# Patient Record
Sex: MALE | Race: WHITE | NOT HISPANIC OR LATINO
[De-identification: names, ages, dates, MRNs, and addresses within clinical notes are randomized per-mention and may not be internally consistent; named-entity substitution may affect disease eponyms.]

---

## 2018-04-08 PROBLEM — Z00.00 ENCOUNTER FOR PREVENTIVE HEALTH EXAMINATION: Status: ACTIVE | Noted: 2018-04-08

## 2018-06-06 ENCOUNTER — APPOINTMENT (OUTPATIENT)
Dept: CARDIOLOGY | Facility: CLINIC | Age: 33
End: 2018-06-06

## 2018-11-14 ENCOUNTER — EMERGENCY (EMERGENCY)
Facility: HOSPITAL | Age: 33
LOS: 0 days | Discharge: HOME | End: 2018-11-14
Admitting: PHYSICIAN ASSISTANT

## 2018-11-14 VITALS
SYSTOLIC BLOOD PRESSURE: 141 MMHG | TEMPERATURE: 98 F | DIASTOLIC BLOOD PRESSURE: 74 MMHG | HEART RATE: 76 BPM | RESPIRATION RATE: 19 BRPM | OXYGEN SATURATION: 98 %

## 2018-11-14 VITALS — WEIGHT: 199.96 LBS | HEIGHT: 70 IN

## 2018-11-14 DIAGNOSIS — N39.0 URINARY TRACT INFECTION, SITE NOT SPECIFIED: ICD-10-CM

## 2018-11-14 DIAGNOSIS — R30.0 DYSURIA: ICD-10-CM

## 2018-11-14 LAB
APPEARANCE UR: CLEAR — SIGNIFICANT CHANGE UP
BILIRUB UR-MCNC: NEGATIVE — SIGNIFICANT CHANGE UP
COLOR SPEC: YELLOW — SIGNIFICANT CHANGE UP
DIFF PNL FLD: NEGATIVE — SIGNIFICANT CHANGE UP
GLUCOSE UR QL: NEGATIVE MG/DL — SIGNIFICANT CHANGE UP
KETONES UR-MCNC: NEGATIVE — SIGNIFICANT CHANGE UP
LEUKOCYTE ESTERASE UR-ACNC: NEGATIVE — SIGNIFICANT CHANGE UP
NITRITE UR-MCNC: NEGATIVE — SIGNIFICANT CHANGE UP
PH UR: 6 — SIGNIFICANT CHANGE UP (ref 5–8)
PROT UR-MCNC: NEGATIVE MG/DL — SIGNIFICANT CHANGE UP
SP GR SPEC: 1.02 — SIGNIFICANT CHANGE UP (ref 1.01–1.03)
UROBILINOGEN FLD QL: 0.2 MG/DL — SIGNIFICANT CHANGE UP (ref 0.2–0.2)

## 2018-11-14 RX ORDER — CEFUROXIME AXETIL 250 MG
1 TABLET ORAL
Qty: 14 | Refills: 0 | OUTPATIENT
Start: 2018-11-14 | End: 2018-11-20

## 2018-11-14 NOTE — ED PROVIDER NOTE - NSFOLLOWUPINSTRUCTIONS_ED_ALL_ED_FT
Take antibiotics as directed and finish the whole course. Follow up with urologist, if symptoms worsen return to the ER

## 2018-11-14 NOTE — ED PROVIDER NOTE - OBJECTIVE STATEMENT
Pt is a 32 yo M c/o urinary burning with hesitancy. Sts he does not feel he is fully voiding when he urinates. Reports burning at the end of urination. Also feels pressure in lower abdomen when he has to urinate. Denies any hematuria, discharge from the penis, swelling or pain to the testicles. No flank pain, fever or chills. Pt is a 34 yo M c/o urinary burning with hesitancy. Sts he does not feel he is fully voiding when he urinates. Reports burning at the end of urination. Also feels pressure in lower abdomen when he has to urinate. Denies any hematuria, discharge from the penis, swelling or pain to the testicles. No flank pain, fever or chills. Pt sts he is sexually active with his wife only, no hx of STD's.

## 2018-11-14 NOTE — ED PROVIDER NOTE - CARE PROVIDER_API CALL
Alfred Lucas), Urology  [address] [place] [address]  [address]  [place] [address]  Phone: [phone]  Fax: [phone]

## 2018-11-14 NOTE — ED ADULT TRIAGE NOTE - CHIEF COMPLAINT QUOTE
"the last few days i;ve noticed that i've had to pee a lot and it feels like i'm not completely emptying my bladder when I go. it's also burning when I urinate" -pt

## 2018-11-14 NOTE — ED PROVIDER NOTE - PHYSICAL EXAMINATION
CONST: Well appearing in NAD  EYES: PERRL, EOMI, Sclera and conjunctiva clear.   CARD: Normal S1 S2; Normal rate and rhythm  RESP: Equal BS B/L, No wheezes, rhonchi or rales. No distress  GI: Soft, non-tender, non-distended.  MS: Normal ROM in all extremities. No midline spinal tenderness. No CVA tenderness BL

## 2018-11-14 NOTE — ED PROVIDER NOTE - NS ED ROS FT
CONST: No fever, chills or bodyaches  EYES: No pain, redness, drainage or visual changes.  ENT: No ear pain or discharge, nasal discharge or congestion. No sore throat  CARD: No chest pain, palpitations  RESP: No SOB, cough, hemoptysis. No hx of asthma or COPD  GI: No abdominal pain, N/V/D  : +Dysuria  MS: No joint pain, back pain or extremity pain/injury  SKIN: No rashes  NEURO: No headache, dizziness, paresthesias or LOC

## 2018-11-15 LAB
C TRACH RRNA SPEC QL NAA+PROBE: SIGNIFICANT CHANGE UP
N GONORRHOEA RRNA SPEC QL NAA+PROBE: SIGNIFICANT CHANGE UP
SPECIMEN SOURCE: SIGNIFICANT CHANGE UP

## 2018-11-16 LAB
CULTURE RESULTS: SIGNIFICANT CHANGE UP
SPECIMEN SOURCE: SIGNIFICANT CHANGE UP

## 2018-11-21 ENCOUNTER — APPOINTMENT (OUTPATIENT)
Dept: UROLOGY | Facility: CLINIC | Age: 33
End: 2018-11-21
Payer: COMMERCIAL

## 2018-11-21 VITALS
BODY MASS INDEX: 28.63 KG/M2 | HEART RATE: 72 BPM | HEIGHT: 70 IN | WEIGHT: 200 LBS | DIASTOLIC BLOOD PRESSURE: 63 MMHG | SYSTOLIC BLOOD PRESSURE: 124 MMHG

## 2018-11-21 DIAGNOSIS — Z84.1 FAMILY HISTORY OF DISORDERS OF KIDNEY AND URETER: ICD-10-CM

## 2018-11-21 DIAGNOSIS — M12.20 VILLONODULAR SYNOVITIS (PIGMENTED), UNSPECIFIED SITE: ICD-10-CM

## 2018-11-21 PROCEDURE — 99203 OFFICE O/P NEW LOW 30 MIN: CPT

## 2018-11-21 NOTE — PHYSICAL EXAM
[General Appearance - Well Developed] : well developed [General Appearance - Well Nourished] : well nourished [Normal Appearance] : normal appearance [Well Groomed] : well groomed [General Appearance - In No Acute Distress] : no acute distress [Heart Rate And Rhythm] : Heart rate and rhythm were normal [Edema] : no peripheral edema [] : no respiratory distress [Respiration, Rhythm And Depth] : normal respiratory rhythm and effort [Exaggerated Use Of Accessory Muscles For Inspiration] : no accessory muscle use [Auscultation Breath Sounds / Voice Sounds] : lungs were clear to auscultation bilaterally [Abdomen Soft] : soft [Abdomen Tenderness] : non-tender [Costovertebral Angle Tenderness] : no ~M costovertebral angle tenderness [Urethral Meatus] : meatus normal [Penis Abnormality] : normal circumcised penis [Testes Tenderness] : no tenderness of the testes [Testes Mass (___cm)] : there were no testicular masses [Anus Abnormality] : the anus and perineum were normal [Prostate Tenderness] : the prostate was not tender [No Prostate Nodules] : no prostate nodules [Prostate Size ___ gm] : prostate size [unfilled] gm [Normal Station and Gait] : the gait and station were normal for the patient's age [No Focal Deficits] : no focal deficits [FreeTextEntry1] : DTR's & BC reflexes were intact  [Oriented To Time, Place, And Person] : oriented to person, place, and time [Affect] : the affect was normal [Mood] : the mood was normal [Not Anxious] : not anxious

## 2018-11-21 NOTE — HISTORY OF PRESENT ILLNESS
[FreeTextEntry1] : Jose is a very pleasant 33-year-old male who the weekend before last started noticing some discomfort when he went to urinate. By Wednesday, November 14 he started feeling of pressure that he had to go, when he went he never felt like he had completely emptied, it was associated with a discomfort deep inside occasionally would get quite sharp almost 4 on a scale of 1 to 10 it will only last a few seconds. He got that bad that he went to the emergency room where they diagnosed him as a urinary tract infection and put him on cefuroxime. He is here for follow-up. He just got  in August, he has not yet gotten her pregnant but then I try and get, and as a whole he feels he’s in good health. His stream is not as strong as it was years ago but he still feels is normal for his age. He wakes up once at night to urinate but says it is with a large volume. 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 \par  [Urinary Frequency] : urinary frequency [Nocturia] : nocturia [Dysuria] : dysuria [Abdominal Pain] : no abdominal pain [Flank Pain] : no flank pain [4] : 4 [Sharp] : sharp [Sudden] : sudden [___ Day(s) Ago] : [unfilled] day(s) ago [Intermittent] : intermittently [___ x Day] : occur [unfilled] times a day [Moderate] : moderate in severity [Improving] : improving [None] : No exacerbating factors are noted [de-identified] : inner scrotum [de-identified] : seconds

## 2018-11-21 NOTE — ASSESSMENT
[FreeTextEntry1] : His physical exam is benign other than for what feels like a cyst in the left epididymis. He tells me that in fact a year ago he had an ultrasound done which confirmed the benign nature. In specific there is no sign of urethritis, orchitis, epididymitis, CVA tenderness, or abdominal pain.\par \par By definition he had no infection is sorely studies were negative, he hasn’t had a discharge and prostatic massage today did not yield Expressed Prostatic Secretions (EPS) and was not sensitive to the point that I would consider this prostatodynia. But I am concerned about is the family history of stones and the symptoms could exactly mimic a distal ureteric stone. I’m going to have a strain his urine as he still having the symptoms in the last 24-hours hopefully if he did have one it is past he will catch up, I will send him for a CT scan stone protocol and have him come back on Monday. If he developed severe pain he will give us a call he’ll probably have to go emergency room but hopefully if it is a small stone and is passing will be able to document and it will pass on its own. If he does have stones then we will do a metabolic workup and probably involve a nephrologist as this is likely to have some genetic component given that his mother already has stones.\par

## 2018-11-29 ENCOUNTER — OTHER (OUTPATIENT)
Age: 33
End: 2018-11-29

## 2018-12-06 ENCOUNTER — FORM ENCOUNTER (OUTPATIENT)
Age: 33
End: 2018-12-06

## 2018-12-07 ENCOUNTER — OUTPATIENT (OUTPATIENT)
Dept: OUTPATIENT SERVICES | Facility: HOSPITAL | Age: 33
LOS: 1 days | Discharge: HOME | End: 2018-12-07

## 2018-12-07 DIAGNOSIS — R30.0 DYSURIA: ICD-10-CM

## 2019-02-07 ENCOUNTER — APPOINTMENT (OUTPATIENT)
Dept: UROLOGY | Facility: CLINIC | Age: 34
End: 2019-02-07
Payer: COMMERCIAL

## 2019-02-07 VITALS
DIASTOLIC BLOOD PRESSURE: 84 MMHG | SYSTOLIC BLOOD PRESSURE: 152 MMHG | BODY MASS INDEX: 28.63 KG/M2 | WEIGHT: 200 LBS | HEIGHT: 70 IN | HEART RATE: 81 BPM

## 2019-02-07 DIAGNOSIS — Z87.898 PERSONAL HISTORY OF OTHER SPECIFIED CONDITIONS: ICD-10-CM

## 2019-02-07 PROCEDURE — 99213 OFFICE O/P EST LOW 20 MIN: CPT

## 2019-02-07 NOTE — ASSESSMENT
[FreeTextEntry1] : I do not know what is going on, there is no infection, there is no stones in this could be stress. I reviewed with him how stress can interfere with the flow of urine through the prostate with the increased intra-prostatic pressure sometimes causing irritative feelings. There are two main drugs that we use to relax the prostate – Flomax which has fewer systemic symptoms especially with related to blood pressure but can cause a lack of emission/ejaculation and – Uroxatral which is not affect ejaculation but can cause orthostatic hypotension. As long as he’s willing to be careful when he gets out of bed I think especially in a young man the side effect of lack of ejaculation is worse than the potential side effect of orthostatic hypotension. Obviously if he gets up begins to feel dizzy he will stop the Uroxatral and get back to me. He will take this for several weeks, keeping a record of his intake and output before he starts and then six weeks after he starts in follow up afterwards possibly for a flow study and a measurement of his post would residual.

## 2019-02-07 NOTE — PHYSICAL EXAM
[General Appearance - Well Developed] : well developed [General Appearance - Well Nourished] : well nourished [Normal Appearance] : normal appearance [Well Groomed] : well groomed [General Appearance - In No Acute Distress] : no acute distress [FreeTextEntry1] : nereyda uptight [Abdomen Soft] : soft [Abdomen Tenderness] : non-tender [Costovertebral Angle Tenderness] : no ~M costovertebral angle tenderness [] : no respiratory distress [Respiration, Rhythm And Depth] : normal respiratory rhythm and effort [Exaggerated Use Of Accessory Muscles For Inspiration] : no accessory muscle use [Oriented To Time, Place, And Person] : oriented to person, place, and time [Affect] : the affect was normal [Mood] : the mood was normal [Not Anxious] : not anxious [Normal Station and Gait] : the gait and station were normal for the patient's age

## 2019-02-07 NOTE — LETTER HEADER
[FreeTextEntry3] : Alfred Lucas M.D.\par Director of Urology\par [facility]\par [address] [place] [address], [address]\[place] [address]

## 2019-02-07 NOTE — HISTORY OF PRESENT ILLNESS
[FreeTextEntry1] : Jose was seen in November of two 2018 complaining of dysuria with lower urinary tract symptoms. The exam had shown nothing. He had been given cefuroxime though please note that the culture came back as no growth. At first he felt a little better but at the three days the symptoms came back. Given that I found nothing on exam but there was a history of stones I thought this could be a distal ureteric stone. We had him strain his urine and we sent him for a CAT scan stone protocol and he was supposed to come back the next week. 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 He is here to review the CAT scan and see what else we can do. [Urinary Frequency] : urinary frequency [Nocturia] : nocturia [Weak Stream] : weak stream [Abdominal Pain] : no abdominal pain [Flank Pain] : no flank pain [4] : 4 [Sharp] : sharp [Sudden] : sudden [___ Day(s) Ago] : [unfilled] day(s) ago [Intermittent] : intermittently [___ x Day] : occur [unfilled] times a day [Moderate] : moderate in severity [Improving] : improving [None] : No exacerbating factors are noted [de-identified] : inner scrotum [de-identified] : seconds

## 2019-04-18 ENCOUNTER — APPOINTMENT (OUTPATIENT)
Dept: UROLOGY | Facility: CLINIC | Age: 34
End: 2019-04-18
Payer: COMMERCIAL

## 2019-04-18 DIAGNOSIS — R35.1 NOCTURIA: ICD-10-CM

## 2019-04-18 DIAGNOSIS — L81.9 DISORDER OF PIGMENTATION, UNSPECIFIED: ICD-10-CM

## 2019-04-18 DIAGNOSIS — R35.0 FREQUENCY OF MICTURITION: ICD-10-CM

## 2019-04-18 DIAGNOSIS — Z78.9 OTHER SPECIFIED HEALTH STATUS: ICD-10-CM

## 2019-04-18 LAB
BILIRUB UR QL STRIP: NORMAL
CLARITY UR: CLEAR
COLLECTION METHOD: NORMAL
GLUCOSE UR-MCNC: NORMAL
HCG UR QL: NORMAL EU/DL
HGB UR QL STRIP.AUTO: NORMAL
KETONES UR-MCNC: NORMAL
LEUKOCYTE ESTERASE UR QL STRIP: NORMAL
NITRITE UR QL STRIP: NORMAL
PH UR STRIP: 5
PROT UR STRIP-MCNC: NORMAL
SP GR UR STRIP: 1.01

## 2019-04-18 PROCEDURE — 81003 URINALYSIS AUTO W/O SCOPE: CPT | Mod: QW

## 2019-04-18 PROCEDURE — 51741 ELECTRO-UROFLOWMETRY FIRST: CPT

## 2019-04-18 PROCEDURE — 51798 US URINE CAPACITY MEASURE: CPT

## 2019-04-18 PROCEDURE — 99214 OFFICE O/P EST MOD 30 MIN: CPT | Mod: 25

## 2019-04-18 NOTE — ASSESSMENT
[FreeTextEntry1] : Reviewed his uroflow study, which was off of medication, and his voiding diary both on and off medication.\par \par He is voiding better with alfuzosin and his voiding diary shows improvement. His uroflow study is within normal limits but he acknowledges it slowed back down once he stopped the alfuzosin as he misunderstood the instructions. At this time he is elected to continue alfuzosin. He'll keep a voiding diary in 2-1/2 months and follow up in 3 months for review.\par \par Please note he had a question concerning his questionable penile lesions physical exam showed flat lesions that look all like pigmented areas then condyloma. One was minimally raised but he tells me it has been that way without change for years. I requested that he take a picture now and we will compare them in 3 months. If they are growing we may want to excise one or two of the lesions and send to pathology. If they are growing he will come in sooner.\par

## 2019-04-18 NOTE — LETTER HEADER
[FreeTextEntry3] : Alfred Lucas M.D.\par Director of Urology\par [facility]\par [address] [place] [address], [address]\[place] [address]

## 2019-04-18 NOTE — PHYSICAL EXAM
[General Appearance - Well Nourished] : well nourished [General Appearance - Well Developed] : well developed [Normal Appearance] : normal appearance [Well Groomed] : well groomed [General Appearance - In No Acute Distress] : no acute distress [Edema] : no peripheral edema [Respiration, Rhythm And Depth] : normal respiratory rhythm and effort [] : no respiratory distress [Exaggerated Use Of Accessory Muscles For Inspiration] : no accessory muscle use [Affect] : the affect was normal [Oriented To Time, Place, And Person] : oriented to person, place, and time [Mood] : the mood was normal [Not Anxious] : not anxious [Normal Station and Gait] : the gait and station were normal for the patient's age [Urethral Meatus] : meatus normal [Penis Abnormality] : normal circumcised penis [Scrotum] : the scrotum was normal [Urinary Bladder Findings] : the bladder was normal on palpation [Testes Tenderness] : no tenderness of the testes [Testes Mass (___cm)] : there were no testicular masses [FreeTextEntry1] : Questionable café au lait spots on the shaft; Nonraised lesions.

## 2019-04-18 NOTE — HISTORY OF PRESENT ILLNESS
[Urinary Frequency] : urinary frequency [Nocturia] : nocturia [Weak Stream] : weak stream [None] : None [FreeTextEntry1] : oJse is a 34-year-old male for who we have been following for bothersome lower urinary tract symptoms.\par \par Prior CT scan was unremarkable and urine testing was negative. Elected to begin alfuzosin p.o. q. daily. He presents today to review his voiding diary both off and on the medication and consider a uroflow study.\par  [Abdominal Pain] : no abdominal pain [Flank Pain] : no flank pain

## 2019-08-01 ENCOUNTER — APPOINTMENT (OUTPATIENT)
Dept: UROLOGY | Facility: CLINIC | Age: 34
End: 2019-08-01
Payer: COMMERCIAL

## 2019-08-01 VITALS
HEIGHT: 70 IN | HEART RATE: 68 BPM | WEIGHT: 205 LBS | SYSTOLIC BLOOD PRESSURE: 119 MMHG | DIASTOLIC BLOOD PRESSURE: 73 MMHG | BODY MASS INDEX: 29.35 KG/M2

## 2019-08-01 PROCEDURE — 99213 OFFICE O/P EST LOW 20 MIN: CPT

## 2019-08-01 RX ORDER — ALFUZOSIN HYDROCHLORIDE 10 MG/1
10 TABLET, EXTENDED RELEASE ORAL DAILY
Qty: 30 | Refills: 2 | Status: DISCONTINUED | COMMUNITY
Start: 2019-02-07 | End: 2019-07-24

## 2019-08-01 NOTE — LETTER HEADER
[FreeTextEntry3] : Alfred Lucas M.D.\par Director of Urology\par [facility]\par [address] [place] [address], [address]\[place] [address]

## 2019-08-01 NOTE — ASSESSMENT
[FreeTextEntry1] : This is a quality not quantity of life. If he feels better without the medication and is no reason to take medication. If he’s not feeling well and the medication isn’t helping the next step would be urodynamics. The only reason to do urodynamics is if it found something he be willing to undergo treatment. If it found dyssynergia he would go biofeedback he would fit found bladder issues we could try bladder paramedics and if it found our prostatic issues we can consider the urolift or resume steam therapy.\par \par Right now he’d rather not have treatment it doesn’t bother him enough. If and when it does will come in we will repeat a flow study both off and then on the five alpha reductase inhibitor and if it is not improved than if he’s not doing better consider formal urodynamics.\par \par In the meantime he can do poor man’s biofeedback. By that I mean reverse Kegel’s. He will tighten the butt muscle before he urinates when he’s in the bathroom ready to void, he will then consciously relax it and see if he voids better. Given that one of the possibilities here is tension and he acknowledges that his symptoms can be worse when life is more tense than reverse Kegel’s may work well enough that he doesn’t have to go for anything else.\par

## 2019-08-01 NOTE — HISTORY OF PRESENT ILLNESS
[FreeTextEntry1] : Jose is 34 years all we’ve been seeing him for close to a year with moderate lower urinary tract symptoms. We had him on Uroxatral and at least semi-objectively he did a little better with the medication. He wasn’t sure if he wants to take it anymore we had him continue with get a repeat record of his intake and output and he was going to come back in and review it. He thought that once he did the record he could stop it he did so and it is a week since he stopped it. He tells me that off the medication he feels no worse if anything he feels better however he has had periods throughout this time course where he felt better and others where he felt worse without changing the medication.

## 2019-08-01 NOTE — PHYSICAL EXAM
[General Appearance - Well Nourished] : well nourished [General Appearance - Well Developed] : well developed [Normal Appearance] : normal appearance [Well Groomed] : well groomed [General Appearance - In No Acute Distress] : no acute distress [] : no respiratory distress [Respiration, Rhythm And Depth] : normal respiratory rhythm and effort [Exaggerated Use Of Accessory Muscles For Inspiration] : no accessory muscle use [Oriented To Time, Place, And Person] : oriented to person, place, and time [Affect] : the affect was normal [Mood] : the mood was normal [Not Anxious] : not anxious

## 2019-08-01 NOTE — ADDENDUM
[FreeTextEntry1] : With respect to the skin lesions they haven’t changed. Exam again shows slightly raised cargo the lesions and I don’t know what they are. I don’t think they are melanoma they could be condyloma but if so I would’ve expected them to change over time. His wife has been checked by her doctor and so far has found nothing. If he wants to know what it is practically speaking he should go to a skin doctor. I am an expert to the mill urinary tract and when he has there is a skin lesion in the skin of the penis not a  issue per se. If he’s really concerned he will go see a dermatologist\par \par F/U here PRN

## 2020-09-13 ENCOUNTER — TRANSCRIPTION ENCOUNTER (OUTPATIENT)
Age: 35
End: 2020-09-13

## 2021-03-12 NOTE — ED ADULT NURSE NOTE - CAS TRG GENERAL AIRWAY, MLM
Detail Level: Detailed
Patent
Procedure To Be Performed At Next Visit: Biopsy by punch method
Introduction Text (Please End With A Colon): The following procedure was deferred:

## 2021-03-27 ENCOUNTER — OUTPATIENT (OUTPATIENT)
Dept: OUTPATIENT SERVICES | Facility: HOSPITAL | Age: 36
LOS: 1 days | Discharge: HOME | End: 2021-03-27

## 2021-03-27 DIAGNOSIS — Z20.828 CONTACT WITH AND (SUSPECTED) EXPOSURE TO OTHER VIRAL COMMUNICABLE DISEASES: ICD-10-CM

## 2021-03-28 LAB — SARS-COV-2 RNA SPEC QL NAA+PROBE: SIGNIFICANT CHANGE UP

## 2022-08-01 ENCOUNTER — APPOINTMENT (OUTPATIENT)
Dept: NEUROLOGY | Facility: CLINIC | Age: 37
End: 2022-08-01

## 2022-08-30 ENCOUNTER — APPOINTMENT (OUTPATIENT)
Dept: NEUROLOGY | Facility: CLINIC | Age: 37
End: 2022-08-30

## 2022-08-30 VITALS
BODY MASS INDEX: 30.49 KG/M2 | SYSTOLIC BLOOD PRESSURE: 135 MMHG | HEART RATE: 77 BPM | HEIGHT: 70 IN | DIASTOLIC BLOOD PRESSURE: 86 MMHG | WEIGHT: 213 LBS

## 2022-08-30 PROCEDURE — 99212 OFFICE O/P EST SF 10 MIN: CPT

## 2022-08-30 NOTE — ASSESSMENT
[FreeTextEntry1] : Patient's Trileptal was renewed we will follow-up with him in 6 months barring problems.\par \par \par \par JUAN Huerta, PA,C \par José Manuel Bishop MD\par

## 2022-08-30 NOTE — PHYSICAL EXAM
[FreeTextEntry1] : Patient is well-developed, well-nourished and in no acute distress.  He was coherent relevant and appropriate.  Gait was normal.

## 2022-08-30 NOTE — HISTORY OF PRESENT ILLNESS
[FreeTextEntry1] : Mr. 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  He is currently taking Trileptal 300 mg 1 tablet 3 times daily, is tolerating the medication well and rarely has any breakthrough dystonic or choreiform type movements.  He has no complaints

## 2023-02-21 ENCOUNTER — APPOINTMENT (OUTPATIENT)
Dept: NEUROLOGY | Facility: CLINIC | Age: 38
End: 2023-02-21

## 2023-04-13 ENCOUNTER — APPOINTMENT (OUTPATIENT)
Dept: NEUROLOGY | Facility: CLINIC | Age: 38
End: 2023-04-13
Payer: COMMERCIAL

## 2023-04-13 VITALS
HEART RATE: 83 BPM | SYSTOLIC BLOOD PRESSURE: 132 MMHG | DIASTOLIC BLOOD PRESSURE: 84 MMHG | WEIGHT: 218 LBS | HEIGHT: 70 IN | BODY MASS INDEX: 31.21 KG/M2

## 2023-04-13 PROCEDURE — 99213 OFFICE O/P EST LOW 20 MIN: CPT

## 2023-04-13 NOTE — PHYSICAL EXAM
[General Appearance - Alert] : alert [General Appearance - In No Acute Distress] : in no acute distress [Oriented To Time, Place, And Person] : oriented to person, place, and time [Impaired Insight] : insight and judgment were intact [Affect] : the affect was normal [Person] : oriented to person [Place] : oriented to place [Time] : oriented to time [Concentration Intact] : normal concentrating ability [Visual Intact] : visual attention was ~T not ~L decreased [Naming Objects] : no difficulty naming common objects [Repeating Phrases] : no difficulty repeating a phrase [Writing A Sentence] : no difficulty writing a sentence [Fluency] : fluency intact [Comprehension] : comprehension intact [Reading] : reading intact [Past History] : adequate knowledge of personal past history [Cranial Nerves Optic (II)] : visual acuity intact bilaterally,  visual fields full to confrontation, pupils equal round and reactive to light [Cranial Nerves Oculomotor (III)] : extraocular motion intact [Cranial Nerves Trigeminal (V)] : facial sensation intact symmetrically [Cranial Nerves Facial (VII)] : face symmetrical [Cranial Nerves Vestibulocochlear (VIII)] : hearing was intact bilaterally [Cranial Nerves Glossopharyngeal (IX)] : tongue and palate midline [Cranial Nerves Accessory (XI - Cranial And Spinal)] : head turning and shoulder shrug symmetric [Cranial Nerves Hypoglossal (XII)] : there was no tongue deviation with protrusion [Motor Strength] : muscle strength was normal in all four extremities [No Muscle Atrophy] : normal bulk in all four extremities [Sensation Tactile Decrease] : light touch was intact [Balance] : balance was intact [2+] : Ankle jerk left 2+ [Sclera] : the sclera and conjunctiva were normal [PERRL With Normal Accommodation] : pupils were equal in size, round, reactive to light, with normal accommodation [Extraocular Movements] : extraocular movements were intact [Hearing Threshold Finger Rub Not Rowan] : hearing was normal [Neck Appearance] : the appearance of the neck was normal [No Spinal Tenderness] : no spinal tenderness [Abnormal Walk] : normal gait [Nail Clubbing] : no clubbing  or cyanosis of the fingernails [Musculoskeletal - Swelling] : no joint swelling seen [Motor Tone] : muscle strength and tone were normal [Past-pointing] : there was no past-pointing [Tremor] : no tremor present [Plantar Reflex Right Only] : normal on the right [Plantar Reflex Left Only] : normal on the left

## 2023-04-13 NOTE — ASSESSMENT
"Franny Headleya" Manuel was seen and treated in our emergency department on 11/14/2022.  She may return to work on 11/18/2022.  Suggested return date. Return to work when fever free for 24 hours without requiring medications.      If you have any questions or concerns, please don't hesitate to call.       RN    " [FreeTextEntry1] : 38 year old male under our care for  Kinesigenic dystonia, a chronic condition for which he is receiving active treatment for. He will continue on his Trileptal 300mg TID and return to the office for follow up in 6 months. \par \par I personally reviewed with the PA, this patient's history and physical exam findings, as documented above. I have discussed the relevant areas of concern, having direct implications to the presenting problems and illnesses, and I have personally examined all pertinent and positive and negative findings, which impact on the prior neurological treatment. \par \par \par Check of the  registry reveals compliance in regards to medication management use\par \par \par Priya Erwin MS, PA-C\par Jenaro Bisohp MD\par

## 2023-04-13 NOTE — HISTORY OF PRESENT ILLNESS
[FreeTextEntry1] : Mr. 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 He is currently prescribed Trileptal 300 mg 1 tablet 3 times daily and since starting he has experienced near reolution of his symptoms. \par \par Today I had the pleasure of seeing Mr. Ortiz for follow up in our office. He continues taking Trilleptal 300mg TID denies negative side effects and states that his symptoms are mostly repressed and if they do occur are very mild. He is overall happy with his current care and medication regimen and wishes to continue as is. \par \par Of Note : Mr. Ortiz had a recent sleep study for sleep apnea with Dr. Cunningham for which he will discuss the results at a follow up appointment.

## 2023-05-25 ENCOUNTER — APPOINTMENT (OUTPATIENT)
Dept: NEUROLOGY | Facility: CLINIC | Age: 38
End: 2023-05-25
Payer: COMMERCIAL

## 2023-05-25 VITALS — WEIGHT: 218 LBS | HEIGHT: 70 IN | BODY MASS INDEX: 31.21 KG/M2

## 2023-05-25 PROCEDURE — 99214 OFFICE O/P EST MOD 30 MIN: CPT

## 2023-05-26 NOTE — PHYSICAL EXAM

## 2023-05-26 NOTE — ASSESSMENT
[FreeTextEntry1] : sleep apnea\par -since the last home sleep study was inconclusive, I would like to send him for an overnight sleep study.\par - I Will Follow up with him Two Weeks after he has completed the Sleep Study. \par \par \par \par \par \par \par \par I, Sofiya Goodman, Attest that this documentation has been prepared under the direction and in the presence of Provider José Manuel Cunningham DO\par \par Thank You for letting me assist in the management of this patient. \par \par José Manuel Cunningham DO\[place] Board Certified, Neurology\par

## 2023-05-26 NOTE — HISTORY OF PRESENT ILLNESS
[FreeTextEntry1] : Mr. WANDY PONCE returns to the office for follow-up and his prior history and physical have been reviewed and he reports no change since last visit.  he has been seeing us for kinesigenic dystonic and well controlled on trileptal 300mg TID.  He presented to the office today for the evaluation of obstructive sleep apnea.  He was evaluated 3 years ago with a home sleep study which was inconclusive.  He continue to report snoring, gasping, episodes, and possible witness apneic episodes by wife.  He also wake up tired almost every day.  his epworth sleepiness score today is 10.\par

## 2023-07-12 ENCOUNTER — APPOINTMENT (OUTPATIENT)
Dept: NEUROLOGY | Facility: CLINIC | Age: 38
End: 2023-07-12
Payer: COMMERCIAL

## 2023-07-12 PROCEDURE — 95811 POLYSOM 6/>YRS CPAP 4/> PARM: CPT | Mod: 26

## 2023-07-12 PROCEDURE — 99214 OFFICE O/P EST MOD 30 MIN: CPT

## 2023-08-02 NOTE — HISTORY OF PRESENT ILLNESS
[FreeTextEntry1] : Mr. Ortiz is a 38 year old man who returns for a follow up. He has on change since his last visit. Patient went for a sleep titration indicating he has severe sleep apnea. 87 episodes in an hour. When he went on the CPAP therapy his numbers went down to 0. Patient sleeps better while on 10 centimeters of pressure. Patient was told the benefits of wearing CPAP machine in hopes of treating sleep apnea. Patient is aware to monitor cleanliness of machine and be aware of upper respiratory infections and sinusitis. 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  He was evaluated 3 years  ago with a home sleep study which was inconclusive.  He continue to report snoring, gasping, episodes, and possible witness apneic episodes by wife. He wakes up tired almost every day. [place] sleepiness score today is 10.\par

## 2023-08-02 NOTE — ASSESSMENT
[FreeTextEntry1] : Sleep Apnea Follow up. \par \par - Patient will Follow up one month after he has good data of CPAP machine. \par \par \par \par \par \par \par \par \par I, Sofiya Goodman, Attest that this documentation has been prepared under the direction and in the presence of Provider José Manuel Cunningham DO\par \par Thank You for letting me assist in the management of this patient. \par \par José Manuel Cunningham DO\[place] Board Certified, Neurology\par

## 2023-09-27 ENCOUNTER — APPOINTMENT (OUTPATIENT)
Dept: NEUROLOGY | Facility: CLINIC | Age: 38
End: 2023-09-27
Payer: COMMERCIAL

## 2023-09-27 VITALS — WEIGHT: 218 LBS | BODY MASS INDEX: 31.21 KG/M2 | HEIGHT: 70 IN

## 2023-09-27 VITALS — DIASTOLIC BLOOD PRESSURE: 79 MMHG | HEART RATE: 78 BPM | SYSTOLIC BLOOD PRESSURE: 129 MMHG

## 2023-09-27 PROCEDURE — 99214 OFFICE O/P EST MOD 30 MIN: CPT

## 2023-10-12 ENCOUNTER — APPOINTMENT (OUTPATIENT)
Dept: NEUROLOGY | Facility: CLINIC | Age: 38
End: 2023-10-12

## 2023-12-20 ENCOUNTER — APPOINTMENT (OUTPATIENT)
Dept: NEUROLOGY | Facility: CLINIC | Age: 38
End: 2023-12-20
Payer: COMMERCIAL

## 2023-12-20 PROCEDURE — 99213 OFFICE O/P EST LOW 20 MIN: CPT

## 2023-12-27 NOTE — HISTORY OF PRESENT ILLNESS
[FreeTextEntry1] : Mr. WANDY PONCE returns to the office for follow-up and his/her prior history and physical have been reviewed and he reports no change since last visit.  he has been seeing us for AMADOU. Patient wsa started on CPAP titration therapy. He overall feels significant improvement with the help of CPAP mask. However, He complains of seeing leakage in the mask and has not been able His leakage has been under 24 which has not changed for one week. Patient will continue to monitor his mask usage a Patient is a mout breather and he gets minor humidity.         Mr. WANDY PONCE returns to the office for follow-up and his prior history and physical have been reviewed and he reports no change since last visit.  he has been seeing us for obstructive sleep apnea and has been put on CPAP for over a month.  He has been compliant with the CPAP usage, and reviewing his efficacy and compliance data, all the numbers look good.  There are no residual apneic episodes. He did wake up with symptoms over t. He did fulfill the minimal usage requirement.  He has not seen any significant clinical benefit as yet.  His complaints today is that he is still restless at night waking up frequently of unclear etiology.  He also has noticed there is a lot of water collecting around the mask as

## 2023-12-27 NOTE — ASSESSMENT
[FreeTextEntry1] : josé miguel-on cpap  - I went over the minimal usage requirement, 4 hours a night 70% of the time,  with [Him/Her] and stressed the importance of cleaning the equipment thoroughly as well as replacing the parts when they are due to reduce the risk of sinusitis and upper respiratory airway infections.  Patient reported understanding.    I, Sofiya Goodamn, Attest that this documentation has been prepared under the direction and in the presence of Provider José Manuel Cunningham DO  Thank You for letting me assist in the management of this patient.   José Manuel Cunningham DO Board Certified, Neurology

## 2024-01-02 ENCOUNTER — APPOINTMENT (OUTPATIENT)
Dept: NEUROLOGY | Facility: CLINIC | Age: 39
End: 2024-01-02

## 2024-01-23 ENCOUNTER — APPOINTMENT (OUTPATIENT)
Dept: NEUROLOGY | Facility: CLINIC | Age: 39
End: 2024-01-23
Payer: COMMERCIAL

## 2024-01-23 VITALS
BODY MASS INDEX: 31.21 KG/M2 | HEIGHT: 70 IN | WEIGHT: 218 LBS | DIASTOLIC BLOOD PRESSURE: 93 MMHG | SYSTOLIC BLOOD PRESSURE: 148 MMHG | HEART RATE: 77 BPM

## 2024-01-23 DIAGNOSIS — G10 HUNTINGTON'S DISEASE: ICD-10-CM

## 2024-01-23 PROCEDURE — 99213 OFFICE O/P EST LOW 20 MIN: CPT

## 2024-01-23 NOTE — ASSESSMENT
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  I will continue to prescribe  Trileptal 300mg TID and follow up in 6 months for reevaluation.  If there is any issue he will contact the office.  Priya Erwin MS, PACRUZITO Cunningham DO, Supervising Physician

## 2024-01-23 NOTE — HISTORY OF PRESENT ILLNESS
[FreeTextEntry1] : IN REVIEW:  Mr. 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 He is currently prescribed Trileptal 300 mg 1 tablet 3 times daily and since starting he has experienced near resolution of his symptoms.   TODAY:   I had the pleasure of seeing Mr. Ortiz today in follow up.  His previous history and physical findings have been reviewed.  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  He states nothing has changed over the past several months with respect to his condition.  He continues taking Trilleptal 300mg TID denies negative side effects and states that his symptoms are mostly repressed and if they do occur are very mild. He is overall happy with his current care and medication regimen and wishes to continue as is and he will undergo updated blood work at this time.

## 2024-01-25 RX ORDER — OXCARBAZEPINE 300 MG/1
300 TABLET, FILM COATED ORAL 3 TIMES DAILY
Qty: 270 | Refills: 2 | Status: DISCONTINUED | COMMUNITY
Start: 2023-04-13 | End: 2024-01-25

## 2024-01-25 RX ORDER — OXCARBAZEPINE 300 MG/1
300 TABLET, FILM COATED ORAL 3 TIMES DAILY
Qty: 270 | Refills: 3 | Status: DISCONTINUED | COMMUNITY
Start: 2022-09-06 | End: 2024-01-25

## 2024-01-25 RX ORDER — OXCARBAZEPINE 300 MG/1
300 TABLET, FILM COATED ORAL TWICE DAILY
Qty: 60 | Refills: 4 | Status: DISCONTINUED | COMMUNITY
Start: 2022-09-02 | End: 2024-01-25

## 2024-01-25 RX ORDER — OXCARBAZEPINE 300 MG/1
300 TABLET, FILM COATED ORAL
Refills: 0 | Status: DISCONTINUED | COMMUNITY
End: 2024-01-25

## 2024-03-10 ENCOUNTER — NON-APPOINTMENT (OUTPATIENT)
Age: 39
End: 2024-03-10

## 2024-03-24 ENCOUNTER — NON-APPOINTMENT (OUTPATIENT)
Age: 39
End: 2024-03-24

## 2024-06-05 ENCOUNTER — APPOINTMENT (OUTPATIENT)
Dept: NEUROLOGY | Facility: CLINIC | Age: 39
End: 2024-06-05
Payer: COMMERCIAL

## 2024-06-05 VITALS — SYSTOLIC BLOOD PRESSURE: 126 MMHG | HEART RATE: 72 BPM | DIASTOLIC BLOOD PRESSURE: 79 MMHG

## 2024-06-05 VITALS — BODY MASS INDEX: 31.21 KG/M2 | WEIGHT: 218 LBS | HEIGHT: 70 IN

## 2024-06-05 DIAGNOSIS — G47.33 OBSTRUCTIVE SLEEP APNEA (ADULT) (PEDIATRIC): ICD-10-CM

## 2024-06-05 DIAGNOSIS — R25.9 UNSPECIFIED ABNORMAL INVOLUNTARY MOVEMENTS: ICD-10-CM

## 2024-06-05 PROCEDURE — 99213 OFFICE O/P EST LOW 20 MIN: CPT

## 2024-06-05 RX ORDER — OXCARBAZEPINE 300 MG/1
300 TABLET, FILM COATED ORAL
Qty: 270 | Refills: 3 | Status: ACTIVE | COMMUNITY
Start: 2023-04-14 | End: 1900-01-01

## 2024-06-05 NOTE — PHYSICAL EXAM
[General Appearance - Alert] : alert [General Appearance - In No Acute Distress] : in no acute distress [Oriented To Time, Place, And Person] : oriented to person, place, and time [Impaired Insight] : insight and judgment were intact [Affect] : the affect was normal [Person] : oriented to person [Place] : oriented to place [Time] : oriented to time [Concentration Intact] : normal concentrating ability [Visual Intact] : visual attention was ~T not ~L decreased [Naming Objects] : no difficulty naming common objects [Repeating Phrases] : no difficulty repeating a phrase [Writing A Sentence] : no difficulty writing a sentence [Fluency] : fluency intact [Comprehension] : comprehension intact [Reading] : reading intact [Past History] : adequate knowledge of personal past history [Cranial Nerves Optic (II)] : visual acuity intact bilaterally,  visual fields full to confrontation, pupils equal round and reactive to light [Cranial Nerves Oculomotor (III)] : extraocular motion intact [Cranial Nerves Trigeminal (V)] : facial sensation intact symmetrically [Cranial Nerves Facial (VII)] : face symmetrical [Cranial Nerves Vestibulocochlear (VIII)] : hearing was intact bilaterally [Cranial Nerves Glossopharyngeal (IX)] : tongue and palate midline [Cranial Nerves Accessory (XI - Cranial And Spinal)] : head turning and shoulder shrug symmetric [Cranial Nerves Hypoglossal (XII)] : there was no tongue deviation with protrusion [Motor Strength] : muscle strength was normal in all four extremities [No Muscle Atrophy] : normal bulk in all four extremities [Sensation Tactile Decrease] : light touch was intact [Balance] : balance was intact [Past-pointing] : there was no past-pointing [Tremor] : no tremor present [2+] : Ankle jerk left 2+ [Plantar Reflex Right Only] : normal on the right [Plantar Reflex Left Only] : normal on the left [Sclera] : the sclera and conjunctiva were normal [PERRL With Normal Accommodation] : pupils were equal in size, round, reactive to light, with normal accommodation [Extraocular Movements] : extraocular movements were intact [Hearing Threshold Finger Rub Not [place]] : hearing was normal [Neck Appearance] : the appearance of the neck was normal [No Spinal Tenderness] : no spinal tenderness [Abnormal Walk] : normal gait [Nail Clubbing] : no clubbing  or cyanosis of the fingernails [Musculoskeletal - Swelling] : no joint swelling seen [Motor Tone] : muscle strength and tone were normal

## 2024-06-05 NOTE — ASSESSMENT
[FreeTextEntry1] : Kinesigenic dystonia -Continue with Trileptal 300 mg 3 times daily  Obstructive sleep apnea-on CPAP therapy - I went over the minimal usage requirement, 4 hours a night 70% of the time,  with him and stressed the importance of cleaning the equipment thoroughly as well as replacing the parts when they are due to reduce the risk of sinusitis and upper respiratory airway infections.  Patient reported understanding. -A trial of probiotics  Follow-up in a year

## 2024-06-05 NOTE — PHYSICAL EXAM
[General Appearance - Alert] : alert [General Appearance - In No Acute Distress] : in no acute distress [Oriented To Time, Place, And Person] : oriented to person, place, and time [Impaired Insight] : insight and judgment were intact [Affect] : the affect was normal [Person] : oriented to person [Place] : oriented to place [Time] : oriented to time [Concentration Intact] : normal concentrating ability [Visual Intact] : visual attention was ~T not ~L decreased [Naming Objects] : no difficulty naming common objects [Repeating Phrases] : no difficulty repeating a phrase [Writing A Sentence] : no difficulty writing a sentence [Fluency] : fluency intact [Comprehension] : comprehension intact [Reading] : reading intact [Past History] : adequate knowledge of personal past history [Cranial Nerves Optic (II)] : visual acuity intact bilaterally,  visual fields full to confrontation, pupils equal round and reactive to light [Cranial Nerves Oculomotor (III)] : extraocular motion intact [Cranial Nerves Trigeminal (V)] : facial sensation intact symmetrically [Cranial Nerves Facial (VII)] : face symmetrical [Cranial Nerves Vestibulocochlear (VIII)] : hearing was intact bilaterally [Cranial Nerves Glossopharyngeal (IX)] : tongue and palate midline [Cranial Nerves Accessory (XI - Cranial And Spinal)] : head turning and shoulder shrug symmetric [Cranial Nerves Hypoglossal (XII)] : there was no tongue deviation with protrusion [Motor Strength] : muscle strength was normal in all four extremities [No Muscle Atrophy] : normal bulk in all four extremities [Sensation Tactile Decrease] : light touch was intact [Balance] : balance was intact [Past-pointing] : there was no past-pointing [Tremor] : no tremor present [2+] : Ankle jerk left 2+ [Plantar Reflex Right Only] : normal on the right [Plantar Reflex Left Only] : normal on the left [Sclera] : the sclera and conjunctiva were normal [PERRL With Normal Accommodation] : pupils were equal in size, round, reactive to light, with normal accommodation [Extraocular Movements] : extraocular movements were intact [Hearing Threshold Finger Rub Not [place]] : hearing was normal [Neck Appearance] : the appearance of the neck was normal [No Spinal Tenderness] : no spinal tenderness [Abnormal Walk] : normal gait [Nail Clubbing] : no clubbing  or cyanosis of the fingernails [Musculoskeletal - Swelling] : no joint swelling seen [Motor Tone] : muscle strength and tone were normal

## 2024-06-05 NOTE — HISTORY OF PRESENT ILLNESS
[FreeTextEntry1] : Mr. WANDY PONCE returns to the office for follow-up and his prior history and physical have been reviewed and he reports no change since last visit.  he has been seeing us for kinesigenic dystonia which has been stable on Trileptal 300 mg 3 times daily.  He also sees us for severe sleep apnea on CPAP therapy.  He has been on CPAP therapy since August with good efficacy and compliance.  His data is reviewed in the office today with 100% compliance of 4 and 6-hour usage, and AHI less than 5.  He is still experimenting with the humidification and temperature for comfort.  Other than less snoring less tiredness during the day, he has not seeing any additional benefit.  We discussed about using probiotics last time but he has not been doing it consistently.  No new complaints

## 2024-07-23 ENCOUNTER — APPOINTMENT (OUTPATIENT)
Dept: NEUROLOGY | Facility: CLINIC | Age: 39
End: 2024-07-23

## 2024-12-25 PROBLEM — F10.90 ALCOHOL USE: Status: ACTIVE | Noted: 2019-04-18

## 2024-12-29 NOTE — REASON FOR VISIT
Sepsis [Follow-Up: _____] : a [unfilled] follow-up visit [FreeTextEntry1] : follow up for kinesigenic dystonia

## 2025-06-10 ENCOUNTER — APPOINTMENT (OUTPATIENT)
Dept: NEUROLOGY | Facility: CLINIC | Age: 40
End: 2025-06-10